# Patient Record
Sex: FEMALE | Race: WHITE | Employment: PART TIME | URBAN - METROPOLITAN AREA
[De-identification: names, ages, dates, MRNs, and addresses within clinical notes are randomized per-mention and may not be internally consistent; named-entity substitution may affect disease eponyms.]

---

## 2017-02-16 ENCOUNTER — OFFICE VISIT (OUTPATIENT)
Dept: FAMILY MEDICINE CLINIC | Facility: CLINIC | Age: 42
End: 2017-02-16

## 2017-02-16 VITALS
SYSTOLIC BLOOD PRESSURE: 132 MMHG | WEIGHT: 188.81 LBS | DIASTOLIC BLOOD PRESSURE: 84 MMHG | BODY MASS INDEX: 29.63 KG/M2 | RESPIRATION RATE: 16 BRPM | HEART RATE: 82 BPM | HEIGHT: 67 IN

## 2017-02-16 DIAGNOSIS — R05.3 CHRONIC COUGH: ICD-10-CM

## 2017-02-16 DIAGNOSIS — I10 ESSENTIAL HYPERTENSION WITH GOAL BLOOD PRESSURE LESS THAN 140/90: Primary | ICD-10-CM

## 2017-02-16 DIAGNOSIS — M79.675 PAIN IN TOES OF BOTH FEET: ICD-10-CM

## 2017-02-16 DIAGNOSIS — M79.674 PAIN IN TOES OF BOTH FEET: ICD-10-CM

## 2017-02-16 DIAGNOSIS — D50.0 IRON DEFICIENCY ANEMIA DUE TO CHRONIC BLOOD LOSS: ICD-10-CM

## 2017-02-16 PROCEDURE — 99214 OFFICE O/P EST MOD 30 MIN: CPT | Performed by: FAMILY MEDICINE

## 2017-02-16 RX ORDER — VALSARTAN 80 MG/1
TABLET ORAL
Qty: 30 TABLET | Refills: 2 | Status: SHIPPED | OUTPATIENT
Start: 2017-02-16 | End: 2017-03-27

## 2017-02-16 RX ORDER — AZELASTINE HYDROCHLORIDE AND FLUTICASONE PROPIONATE 137; 50 UG/1; UG/1
2 SPRAY, METERED NASAL 2 TIMES DAILY
Refills: 3 | COMMUNITY
Start: 2017-01-26 | End: 2019-12-17

## 2017-02-16 RX ORDER — ASCORBIC ACID 500 MG
500 TABLET ORAL 2 TIMES DAILY
COMMUNITY
End: 2017-06-16

## 2017-02-16 RX ORDER — FLUTICASONE FUROATE AND VILANTEROL TRIFENATATE 100; 25 UG/1; UG/1
1 POWDER RESPIRATORY (INHALATION) DAILY
Refills: 5 | COMMUNITY
Start: 2017-01-26 | End: 2018-12-27

## 2017-02-16 NOTE — PROGRESS NOTES
Layton Morales is a 35year old female. HPI:       Hypertension: Stable. Patient taking lisinopril as directed. No side effects noted to the lisinopril. However, patient has been battling a cough for the last several months.   Patient states s BASE) MCG/ACT Inhalation Aero Soln Inhale 2 puffs into the lungs every 4 (four) hours as needed. For wheezing Disp: 3 Inhaler Rfl: 5   Fexofenadine HCl (ALLEGRA) 180 MG Oral Tab Take 180 mg by mouth daily.  Disp:  Rfl:    VITAMIN D3 OR 1 tab daily/5000 unit rashes  HEAD: NCAT  NECK: supple, no adenopathy, no thyromegaly, no masses  LUNGS: CTA A/P no wheezes/ronchi/rales/crackles  CARDIO: RRR, +S1/S2, no mm/S3/S4  VASCULAR: Radial pulses 2+ b/l.  no edema  GI: normal bowel sounds, NT/ND, no pulsations, no r/r/ to the ACE inhibitor. Discontinue the ACE inhibitor and start valsartan. Follow-up in 3 months, sooner if needed for the hypertension.   Patient will monitor her blood pressure at home or at the drugstore.  - valsartan 80 MG Oral Tab; 1/2 tab po daily for

## 2017-02-23 PROBLEM — I10 ESSENTIAL HYPERTENSION WITH GOAL BLOOD PRESSURE LESS THAN 140/90: Status: ACTIVE | Noted: 2017-02-23

## 2017-02-23 PROBLEM — M79.675 PAIN IN TOES OF BOTH FEET: Status: ACTIVE | Noted: 2017-02-23

## 2017-02-23 PROBLEM — R05.3 CHRONIC COUGH: Status: ACTIVE | Noted: 2017-02-23

## 2017-02-23 PROBLEM — M79.674 PAIN IN TOES OF BOTH FEET: Status: ACTIVE | Noted: 2017-02-23

## 2017-03-24 ENCOUNTER — TELEPHONE (OUTPATIENT)
Dept: FAMILY MEDICINE CLINIC | Facility: CLINIC | Age: 42
End: 2017-03-24

## 2017-03-24 DIAGNOSIS — I10 ESSENTIAL HYPERTENSION WITH GOAL BLOOD PRESSURE LESS THAN 140/90: Primary | ICD-10-CM

## 2017-03-24 NOTE — TELEPHONE ENCOUNTER
Patient states that she believes that there is a problem with the valsartan that she is now taking. Her BP has been running high-just the diastolic has been in the high 80's to low 90's.  She states that she has swelling from her knees down and she \" just

## 2017-03-24 NOTE — TELEPHONE ENCOUNTER
If her cough went away with discontinuation of the lisinopril, I wouldn't recommend going back on it. The fatigue is probably from the valsartan, she doesn't need to restart the Fe supplement right now.   I recommend we start her on hydrochlorothiazide 25m

## 2017-03-24 NOTE — TELEPHONE ENCOUNTER
Spoke to patient with the instructions. Pt states she has been off the lisinopril for one month and cough still there. She feels it is allergy related.   She states that the low dose put her blood pressure perfect and she wants to go back on the Rachell Motta

## 2017-03-27 RX ORDER — LISINOPRIL 10 MG/1
10 TABLET ORAL DAILY
Qty: 90 TABLET | Refills: 0 | Status: SHIPPED | OUTPATIENT
Start: 2017-03-27 | End: 2017-05-24

## 2017-03-29 ENCOUNTER — TELEPHONE (OUTPATIENT)
Dept: FAMILY MEDICINE CLINIC | Facility: CLINIC | Age: 42
End: 2017-03-29

## 2017-03-29 DIAGNOSIS — I10 ESSENTIAL HYPERTENSION WITH GOAL BLOOD PRESSURE LESS THAN 140/90: Primary | ICD-10-CM

## 2017-03-29 RX ORDER — HYDROCHLOROTHIAZIDE 25 MG/1
25 TABLET ORAL DAILY
Qty: 30 TABLET | Refills: 1 | Status: SHIPPED | OUTPATIENT
Start: 2017-03-29 | End: 2017-05-26

## 2017-03-29 NOTE — TELEPHONE ENCOUNTER
Talked about medication change last week.   Pt wants to try new medication. (blood pressure)  Please send to [facility]

## 2017-03-30 NOTE — TELEPHONE ENCOUNTER
lmom for pt with instructions and reminded her as well for the lab test in 2 weeks and follow up in 4 weeks.   Orders in system

## 2017-05-22 RX ORDER — MONTELUKAST SODIUM 10 MG/1
TABLET ORAL
Qty: 90 TABLET | OUTPATIENT
Start: 2017-05-22

## 2017-05-23 ENCOUNTER — TELEPHONE (OUTPATIENT)
Dept: FAMILY MEDICINE CLINIC | Facility: CLINIC | Age: 42
End: 2017-05-23

## 2017-05-23 DIAGNOSIS — J30.1 SEASONAL ALLERGIC RHINITIS DUE TO POLLEN: Primary | ICD-10-CM

## 2017-05-23 DIAGNOSIS — J45.20 MILD INTERMITTENT ASTHMA WITHOUT COMPLICATION: ICD-10-CM

## 2017-05-23 NOTE — TELEPHONE ENCOUNTER
Pt is concerned about her medication being denied, she would like a nurse to call her so she can understand completely.

## 2017-05-24 DIAGNOSIS — I10 ESSENTIAL HYPERTENSION WITH GOAL BLOOD PRESSURE LESS THAN 140/90: Primary | ICD-10-CM

## 2017-05-24 RX ORDER — LISINOPRIL 10 MG/1
10 TABLET ORAL DAILY
Qty: 90 TABLET | Refills: 0 | Status: SHIPPED | OUTPATIENT
Start: 2017-05-24 | End: 2017-06-16

## 2017-05-24 RX ORDER — MONTELUKAST SODIUM 10 MG/1
10 TABLET ORAL NIGHTLY
Qty: 30 TABLET | Refills: 0 | Status: SHIPPED | COMMUNITY
Start: 2017-05-24 | End: 2017-06-16

## 2017-05-24 NOTE — TELEPHONE ENCOUNTER
Spoke to patient and explained that med was denied because she has not been seen for asthma this year and policy is every 6 months for chronic conditions.   Explained that she can discuss further with  At her appt about the possibility of coming in yearl

## 2017-05-26 ENCOUNTER — TELEPHONE (OUTPATIENT)
Dept: FAMILY MEDICINE CLINIC | Facility: CLINIC | Age: 42
End: 2017-05-26

## 2017-05-26 DIAGNOSIS — I10 ESSENTIAL HYPERTENSION WITH GOAL BLOOD PRESSURE LESS THAN 140/90: Primary | ICD-10-CM

## 2017-05-26 RX ORDER — HYDROCHLOROTHIAZIDE 25 MG/1
25 TABLET ORAL DAILY
Qty: 30 TABLET | Refills: 0 | Status: SHIPPED | OUTPATIENT
Start: 2017-05-26 | End: 2017-06-16

## 2017-05-26 NOTE — TELEPHONE ENCOUNTER
Future Appointments  Date Time Provider Greg Nabila   6/14/2017 10:00 AM Abi Penaloza DO [facility] [place]     Rx for HCTZ 25 mg approved qty 30 NR

## 2017-06-16 ENCOUNTER — OFFICE VISIT (OUTPATIENT)
Dept: FAMILY MEDICINE CLINIC | Facility: CLINIC | Age: 42
End: 2017-06-16

## 2017-06-16 VITALS
HEART RATE: 83 BPM | SYSTOLIC BLOOD PRESSURE: 122 MMHG | RESPIRATION RATE: 16 BRPM | DIASTOLIC BLOOD PRESSURE: 84 MMHG | BODY MASS INDEX: 31.11 KG/M2 | HEIGHT: 67 IN | WEIGHT: 198.19 LBS

## 2017-06-16 DIAGNOSIS — R00.0 TACHYCARDIA: ICD-10-CM

## 2017-06-16 DIAGNOSIS — R63.5 WEIGHT GAIN: ICD-10-CM

## 2017-06-16 DIAGNOSIS — E66.09 NON MORBID OBESITY DUE TO EXCESS CALORIES: ICD-10-CM

## 2017-06-16 DIAGNOSIS — J45.20 MILD INTERMITTENT ASTHMA WITHOUT COMPLICATION: ICD-10-CM

## 2017-06-16 DIAGNOSIS — I10 ESSENTIAL HYPERTENSION WITH GOAL BLOOD PRESSURE LESS THAN 140/90: Primary | ICD-10-CM

## 2017-06-16 DIAGNOSIS — E01.0 THYROMEGALY: ICD-10-CM

## 2017-06-16 DIAGNOSIS — D50.9 IRON DEFICIENCY ANEMIA, UNSPECIFIED IRON DEFICIENCY ANEMIA TYPE: ICD-10-CM

## 2017-06-16 DIAGNOSIS — J30.1 SEASONAL ALLERGIC RHINITIS DUE TO POLLEN: ICD-10-CM

## 2017-06-16 PROCEDURE — 93000 ELECTROCARDIOGRAM COMPLETE: CPT | Performed by: FAMILY MEDICINE

## 2017-06-16 PROCEDURE — 99214 OFFICE O/P EST MOD 30 MIN: CPT | Performed by: FAMILY MEDICINE

## 2017-06-16 RX ORDER — MONTELUKAST SODIUM 10 MG/1
10 TABLET ORAL NIGHTLY
Qty: 90 TABLET | Refills: 1 | Status: SHIPPED | OUTPATIENT
Start: 2017-06-16 | End: 2018-12-27

## 2017-06-16 RX ORDER — HYDROCHLOROTHIAZIDE 25 MG/1
25 TABLET ORAL EVERY MORNING
Qty: 90 TABLET | Refills: 1 | Status: SHIPPED | OUTPATIENT
Start: 2017-06-16 | End: 2017-11-21

## 2017-06-16 NOTE — PROGRESS NOTES
Annabella Youngblood is a 35year old female. HPI:       HTN: Stable. . Severity is mild, patient is on one agent for hypertension. Pt has been taking hydrochlorothiazide as instructed.   She thought she was having side effects from the ACE inhibitor a Oral Tab Take 1 tablet (25 mg total) by mouth daily. Disp: 30 tablet Rfl: 0   [DISCONTINUED] Montelukast Sodium 10 MG Oral Tab Take 1 tablet (10 mg total) by mouth nightly.  Disp: 30 tablet Rfl: 0      Past Medical History   Diagnosis Date   • ANEMIA      2 wheezes/ronchi/rales/crackles  CARDIO: RRR, +S1/S2, no mm/S3/S4  VASCULAR: Radial pulses 2+ b/l.  no edema  GI: normal bowel sounds, NT/ND, no pulsations, no r/r/g, no masses, no HSM  EXTREMITIES: no cyanosis or clubbing  NEURO: Alert and Oriented x3, CN I WAVE PROGRESSION         1. Essential hypertension with goal blood pressure less than 140/90  Stable. Continue hydrochlorothiazide for now. Check labs. ECG normal.  - CBC W/DIFF; Future  - COMP METABOLIC PANEL; Future  - Vitamin B12 [E];  Future  - TSH a #8.            Orders Placed This Encounter  CBC W/DIFF  COMP METABOLIC PANEL  Vitamin M00 [E]  TSH and Free T4 [E]  Triiodothyronine (T3) Total [E]  Thyroid peroxidase & thyroglobulin ab [E]  Thyroid Stimulating Immunoglob  Iron And Tibc [E]  Ferritin [E]

## 2017-06-18 PROBLEM — J30.1 SEASONAL ALLERGIC RHINITIS DUE TO POLLEN: Status: ACTIVE | Noted: 2017-06-18

## 2017-06-18 PROBLEM — R00.0 TACHYCARDIA: Status: ACTIVE | Noted: 2017-06-18

## 2017-07-05 ENCOUNTER — LAB ENCOUNTER (OUTPATIENT)
Dept: LAB | Age: 42
End: 2017-07-05
Attending: FAMILY MEDICINE
Payer: COMMERCIAL

## 2017-07-05 DIAGNOSIS — I10 ESSENTIAL HYPERTENSION, MALIGNANT: Primary | ICD-10-CM

## 2017-07-05 DIAGNOSIS — E04.9 ENLARGEMENT OF THYROID: ICD-10-CM

## 2017-07-05 LAB
ALBUMIN SERPL-MCNC: 3.8 G/DL (ref 3.5–4.8)
ALP LIVER SERPL-CCNC: 54 U/L (ref 37–98)
ALT SERPL-CCNC: 29 U/L (ref 14–54)
ANTI-THYROGLOBULIN: <15 U/ML (ref ?–60)
ANTI-THYROPEROXIDASE: <28 U/ML (ref ?–60)
AST SERPL-CCNC: 20 U/L (ref 15–41)
BASOPHILS # BLD AUTO: 0.05 X10(3) UL (ref 0–0.1)
BASOPHILS NFR BLD AUTO: 0.9 %
BILIRUB SERPL-MCNC: 0.5 MG/DL (ref 0.1–2)
BUN BLD-MCNC: 10 MG/DL (ref 8–20)
CALCIUM BLD-MCNC: 8.5 MG/DL (ref 8.3–10.3)
CHLORIDE: 109 MMOL/L (ref 101–111)
CO2: 25 MMOL/L (ref 22–32)
CREAT BLD-MCNC: 0.76 MG/DL (ref 0.55–1.02)
DEPRECATED HBV CORE AB SER IA-ACNC: 5.4 NG/ML (ref 10–291)
EOSINOPHIL # BLD AUTO: 0.04 X10(3) UL (ref 0–0.3)
EOSINOPHIL NFR BLD AUTO: 0.7 %
ERYTHROCYTE [DISTWIDTH] IN BLOOD BY AUTOMATED COUNT: 12.6 % (ref 11.5–16)
FREE T4: 1.1 NG/DL (ref 0.9–1.8)
GLUCOSE BLD-MCNC: 73 MG/DL (ref 70–99)
HAV AB SERPL IA-ACNC: 462 PG/ML (ref 193–986)
HCT VFR BLD AUTO: 39.7 % (ref 34–50)
HGB BLD-MCNC: 12.7 G/DL (ref 12–16)
IMMATURE GRANULOCYTE COUNT: 0.01 X10(3) UL (ref 0–1)
IMMATURE GRANULOCYTE RATIO %: 0.2 %
IRON SATURATION: 19 % (ref 13–45)
IRON: 91 UG/DL (ref 28–170)
LYMPHOCYTES # BLD AUTO: 1.45 X10(3) UL (ref 0.9–4)
LYMPHOCYTES NFR BLD AUTO: 26.4 %
M PROTEIN MFR SERPL ELPH: 7.2 G/DL (ref 6.1–8.3)
MCH RBC QN AUTO: 28.5 PG (ref 27–33.2)
MCHC RBC AUTO-ENTMCNC: 32 G/DL (ref 31–37)
MCV RBC AUTO: 89 FL (ref 81–100)
MONOCYTES # BLD AUTO: 0.48 X10(3) UL (ref 0.1–0.6)
MONOCYTES NFR BLD AUTO: 8.7 %
NEUTROPHIL ABS PRELIM: 3.47 X10 (3) UL (ref 1.3–6.7)
NEUTROPHILS # BLD AUTO: 3.47 X10(3) UL (ref 1.3–6.7)
NEUTROPHILS NFR BLD AUTO: 63.1 %
PLATELET # BLD AUTO: 238 10(3)UL (ref 150–450)
POTASSIUM SERPL-SCNC: 4.2 MMOL/L (ref 3.6–5.1)
RBC # BLD AUTO: 4.46 X10(6)UL (ref 3.8–5.1)
RED CELL DISTRIBUTION WIDTH-SD: 41.3 FL (ref 35.1–46.3)
SODIUM SERPL-SCNC: 140 MMOL/L (ref 136–144)
T3 SERPL-MCNC: 68 NG/DL (ref 60–181)
TOTAL IRON BINDING CAPACITY: 489 UG/DL (ref 298–536)
TRANSFERRIN: 328 MG/DL (ref 200–360)
TSI SER-ACNC: 0.68 MIU/ML (ref 0.35–5.5)
WBC # BLD AUTO: 5.5 X10(3) UL (ref 4–13)

## 2017-07-05 PROCEDURE — 85025 COMPLETE CBC W/AUTO DIFF WBC: CPT

## 2017-07-05 PROCEDURE — 80053 COMPREHEN METABOLIC PANEL: CPT

## 2017-07-05 PROCEDURE — 84443 ASSAY THYROID STIM HORMONE: CPT

## 2017-07-05 PROCEDURE — 86800 THYROGLOBULIN ANTIBODY: CPT

## 2017-07-05 PROCEDURE — 84445 ASSAY OF TSI GLOBULIN: CPT

## 2017-07-05 PROCEDURE — 82607 VITAMIN B-12: CPT

## 2017-07-05 PROCEDURE — 82728 ASSAY OF FERRITIN: CPT

## 2017-07-05 PROCEDURE — 86376 MICROSOMAL ANTIBODY EACH: CPT

## 2017-07-05 PROCEDURE — 80050 GENERAL HEALTH PANEL: CPT

## 2017-07-05 PROCEDURE — 84439 ASSAY OF FREE THYROXINE: CPT

## 2017-07-05 PROCEDURE — 83540 ASSAY OF IRON: CPT

## 2017-07-05 PROCEDURE — 83550 IRON BINDING TEST: CPT

## 2017-07-05 PROCEDURE — 84480 ASSAY TRIIODOTHYRONINE (T3): CPT

## 2017-07-08 LAB — THYROID STIMULATING IMMUNOGLOBULIN: 86 %

## 2017-07-14 ENCOUNTER — OFFICE VISIT (OUTPATIENT)
Dept: FAMILY MEDICINE CLINIC | Facility: CLINIC | Age: 42
End: 2017-07-14

## 2017-07-14 VITALS
BODY MASS INDEX: 30.29 KG/M2 | HEIGHT: 67 IN | WEIGHT: 193 LBS | DIASTOLIC BLOOD PRESSURE: 76 MMHG | SYSTOLIC BLOOD PRESSURE: 130 MMHG | HEART RATE: 80 BPM

## 2017-07-14 DIAGNOSIS — Z78.9 ACE INHIBITOR INTOLERANCE: ICD-10-CM

## 2017-07-14 DIAGNOSIS — R05.3 CHRONIC COUGH: ICD-10-CM

## 2017-07-14 DIAGNOSIS — I10 ESSENTIAL HYPERTENSION WITH GOAL BLOOD PRESSURE LESS THAN 140/90: Primary | ICD-10-CM

## 2017-07-14 PROCEDURE — 99214 OFFICE O/P EST MOD 30 MIN: CPT | Performed by: FAMILY MEDICINE

## 2017-07-14 NOTE — PROGRESS NOTES
Jonny Perez is a 38year old female. HPI:     HTN:  stable. Severity is mild, pt is on one agent to control her HTN. Pt has been taking medications as instructed, no medication side effects. Home BP monitoring: BP at home varies.  Checks he Smokeless tobacco: Never Used                      Alcohol use: Yes           0.0 oz/week     Comment: twice a month        Family History   Problem Relation Age of Onset   • Heart Disorder Mother      some leaking valve    • Thyroid disease Mother 170 ug/dL 91 56 30 (L)    TRANSFERRIN      200 - 360 mg/dL 328      Iron Bind. Cap.(TIBC)      298 - 536 ug/dL 489 362 452 (H)    IRON SATURATION      13 - 45 % 19      Saturation Percent      13 - 45 %  16 6.6 (L)    TSH      0.350 - 5.500 mIU/mL 0.679   0

## 2017-07-15 NOTE — PATIENT INSTRUCTIONS
Eating Heart-Healthy Foods  Eating has a big impact on your heart health. In fact, eating healthier can improve several of your heart risks at once. For instance, it helps you manage weight, cholesterol, and blood pressure.  Here are ideas to help you aayush Aim to make these foods staples of your diet. If you have diabetes, you may have different recommendations than what is listed here:  · Fruits and vegetable provide plenty of nutrients without a lot of calories.  At meals, fill half your plate with these fo · Choose ingredients that spice up your food without adding calories, fat, or sodium. Try these items: horseradish, hot sauce, lemon, mustard, nonfat salad dressings, and vinegar.  For salt-free herbs and spices, try basil, cilantro, cinnamon, pepper, and r

## 2017-10-06 DIAGNOSIS — I10 ESSENTIAL HYPERTENSION WITH GOAL BLOOD PRESSURE LESS THAN 140/90: ICD-10-CM

## 2017-10-06 RX ORDER — HYDROCHLOROTHIAZIDE 25 MG/1
25 TABLET ORAL EVERY MORNING
Qty: 90 TABLET | Refills: 1 | Status: CANCELLED
Start: 2017-10-06

## 2017-10-06 RX ORDER — HYDROCHLOROTHIAZIDE 25 MG/1
TABLET ORAL
Qty: 90 TABLET | OUTPATIENT
Start: 2017-10-06

## 2017-10-09 DIAGNOSIS — I10 ESSENTIAL HYPERTENSION WITH GOAL BLOOD PRESSURE LESS THAN 140/90: ICD-10-CM

## 2017-10-10 RX ORDER — HYDROCHLOROTHIAZIDE 25 MG/1
TABLET ORAL
Qty: 90 TABLET | OUTPATIENT
Start: 2017-10-10

## 2017-10-31 DIAGNOSIS — I10 ESSENTIAL HYPERTENSION WITH GOAL BLOOD PRESSURE LESS THAN 140/90: ICD-10-CM

## 2017-10-31 RX ORDER — HYDROCHLOROTHIAZIDE 25 MG/1
25 TABLET ORAL EVERY MORNING
Qty: 90 TABLET | Refills: 1 | Status: CANCELLED
Start: 2017-10-31

## 2017-11-01 NOTE — TELEPHONE ENCOUNTER
Spoke to patient directly and she states she is looking for a refill on the HCTZ. 1) pt was due for a follow up in October for her HTN and 2) Dr. Marek Victor gave pt a 90 day with 1 refill sent to [address] [place] [address] on 6/16/17 so pt should not need a refill until Dec.  Pt states that because mail order takes too long that she wants to ask for her refill now. Explained that would be unable to refill at this time due to fact it would be considered an too early refill and that she will need to see Dr. Marek Victor for the further refills. Pt states\" that she has no raymond in this doctor anymore and that she is the only doctor that practices this way\"  She said \" just to forget it as she is going to be finding a new doctor\". She said that \" when I was here for my visit the she could not figure out what she was doing in the room and she was mad because she was having problems with her system and was complaining in front of a patient before she came in the room to see me\".

## 2017-11-22 PROCEDURE — 81001 URINALYSIS AUTO W/SCOPE: CPT | Performed by: INTERNAL MEDICINE

## 2019-01-02 PROCEDURE — 81003 URINALYSIS AUTO W/O SCOPE: CPT | Performed by: INTERNAL MEDICINE

## (undated) NOTE — MR AVS SNAPSHOT
[facility]  [phone]               Thank you for choosing us for your health care visit with Oumar Rg DO.   We are glad to serve you and happy to provide you with this s 140/90 [I10], Iron deficiency anemia, unspecified iron deficiency anemia type [D50.9]           TSH and Free T4 [E]    Complete by:  Jun 16, 2017 (Approximate)    Thyroid profile includes: T4, free  and TSH   Assoc Dx:  Essential hypertension with goal blo Instructions and Information about Your Health     None      Allergies as of Jun 16, 2017     Dog Dander [Dander]     Dust     Levaquin [Levofloxacin] Hives    Lisinopril Coughing    Mold     Pollen                 Today's Vital Signs     BP Pulse Height W Summaries. If you've been to the Emergency Department or your doctor's office, you can view your past visit information in [facility] by going to Visits < Visit Summaries. [facility] questions? Call [phone] for help.   [facility] is NOT to be used for urge

## (undated) NOTE — MR AVS SNAPSHOT
[facility] [place]  [facility]  [phone]               Thank you for choosing us for your health care visit with Carla Connelly DO.   We are glad to serve you and happy to provide you with this s 1/2 tab po daily for 1-2 weeks, then increase to one full tab daily. Commonly known as:  DIOVAN           Vitamin C 500 MG Tabs   Take 500 mg by mouth 2 (two) times daily.    Commonly known as:  VITAMIN C           VITAMIN D3 OR   1 tab daily/5000 units Assoc Dx:  Pain in toes of both feet [M79.674, M79.675]          MyChart     Visit [employer]  You can access your MyChart to more actively manage your health care and view more details from this visit by going to [email or web address].   If you've rec Don’t forget strength training with weights and resistance Set goals and track your progress   You don’t need to join a gym. Home exercises work great.  Put more priority on exercise in your life                    Visit [facility] online at